# Patient Record
Sex: MALE | HISPANIC OR LATINO | URBAN - METROPOLITAN AREA
[De-identification: names, ages, dates, MRNs, and addresses within clinical notes are randomized per-mention and may not be internally consistent; named-entity substitution may affect disease eponyms.]

---

## 2019-04-04 ENCOUNTER — OFFICE VISIT (OUTPATIENT)
Dept: URBAN - METROPOLITAN AREA CLINIC 17 | Facility: CLINIC | Age: 74
End: 2019-04-04
Payer: MEDICARE

## 2019-04-04 DIAGNOSIS — H11.042 STATIONARY PERIPHERAL PTERYGIUM OF LEFT EYE: Chronic | ICD-10-CM

## 2019-04-04 DIAGNOSIS — G51.31 CLONIC HEMIFACIAL SPASM, RIGHT: Primary | Chronic | ICD-10-CM

## 2019-04-04 PROCEDURE — 64612 DESTROY NERVE FACE MUSCLE: CPT | Performed by: OPHTHALMOLOGY

## 2019-04-04 PROCEDURE — 92012 INTRM OPH EXAM EST PATIENT: CPT | Performed by: OPHTHALMOLOGY

## 2019-04-04 ASSESSMENT — INTRAOCULAR PRESSURE
OD: 15
OS: 15

## 2019-04-04 NOTE — IMPRESSION/PLAN
Impression: Dermatochalasis of left upper eyelid: H02.834. OS. Condition: established, worsening. Symptoms: will continue to monitor. Vision: vision threatening. Plan: Discussed diagnosis in detail with patient. Discussed treatment options with patient. Patient prefers no treatment at this time. Will continue to observe condition and or symptoms.

## 2019-04-04 NOTE — IMPRESSION/PLAN
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 OS. Condition: established, stable. Symptoms: will continue to monitor. Vision: vision not affected. Plan: Discussed diagnosis in detail with patient. Discussed treatment options with patient. No treatment is required at this time. Will continue to observe condition and or symptoms.

## 2019-04-04 NOTE — IMPRESSION/PLAN
Impression: Clonic hemifacial spasm, right: G51.31. OD. Condition: recurrent. Symptoms: will treat with meds. Vision: vision affected. Plan: Discussed diagnosis in detail with patient. Discussed treatment options with patient. No change to current treatment. Current therapy is best for patient. Risks and benefits were discussed, explained and understood by patient. Proceed with 40 Units of Botox, right side. See template for pattern.

## 2019-04-04 NOTE — IMPRESSION/PLAN
Impression: Dermatochalasis of right upper eyelid: H02.831. OD. Condition: established, worsening. Symptoms: will continue to monitor. Vision: vision threatening. Plan: Discussed diagnosis in detail with patient. Discussed treatment options with patient. Patient prefers no treatment at this time. Will continue to observe condition and or symptoms.

## 2019-04-18 ENCOUNTER — OFFICE VISIT (OUTPATIENT)
Dept: URBAN - METROPOLITAN AREA CLINIC 17 | Facility: CLINIC | Age: 74
End: 2019-04-18
Payer: MEDICARE

## 2019-04-18 PROCEDURE — 92012 INTRM OPH EXAM EST PATIENT: CPT | Performed by: OPTOMETRIST

## 2019-04-18 RX ORDER — NEOMYCIN, POLYMYXIN B SULFATES, DEXAMETHASONE 1; 3.5; 1 MG/G; MG/G; [USP'U]/G
OINTMENT OPHTHALMIC
Qty: 1 | Refills: 0 | Status: INACTIVE
Start: 2019-04-18 | End: 2019-05-08

## 2019-04-18 ASSESSMENT — INTRAOCULAR PRESSURE
OS: 12
OD: 11

## 2019-04-18 NOTE — IMPRESSION/PLAN
Impression: Other specified disorders of eyelid: H02.89. OD. Plan: Discussed condition with pt. New prescription given today.  Pt to start Neto-poly-dex  maryann TID OD

## 2019-05-08 ENCOUNTER — OFFICE VISIT (OUTPATIENT)
Dept: URBAN - METROPOLITAN AREA CLINIC 17 | Facility: CLINIC | Age: 74
End: 2019-05-08
Payer: MEDICARE

## 2019-05-08 DIAGNOSIS — H02.89 OTHER SPECIFIED DISORDERS OF EYELID: Primary | ICD-10-CM

## 2019-05-08 PROCEDURE — 99213 OFFICE O/P EST LOW 20 MIN: CPT | Performed by: OPTOMETRIST

## 2019-05-08 RX ORDER — NEOMYCIN, POLYMYXIN B SULFATES, DEXAMETHASONE 1; 3.5; 1 MG/G; MG/G; [USP'U]/G
OINTMENT OPHTHALMIC
Qty: 1 | Refills: 0 | Status: INACTIVE
Start: 2019-05-08 | End: 2020-06-03

## 2019-05-08 ASSESSMENT — INTRAOCULAR PRESSURE
OS: 8
OD: 6

## 2019-05-08 NOTE — IMPRESSION/PLAN
Impression: Other specified disorders of eyelid: H02.89. OD.-improved Plan: Discussed condition with pt. Pt to finish rhea-poly-dex maryann.

## 2020-06-03 ENCOUNTER — OFFICE VISIT (OUTPATIENT)
Dept: URBAN - METROPOLITAN AREA CLINIC 17 | Facility: CLINIC | Age: 75
End: 2020-06-03
Payer: MEDICARE

## 2020-06-03 DIAGNOSIS — H02.831 DERMATOCHALASIS OF RIGHT UPPER EYELID: ICD-10-CM

## 2020-06-03 DIAGNOSIS — H10.45 OTHER CHRONIC ALLERGIC CONJUNCTIVITIS: Primary | ICD-10-CM

## 2020-06-03 DIAGNOSIS — H15.111 EPISCLERITIS PERIODICA FUGAX, RIGHT EYE: ICD-10-CM

## 2020-06-03 DIAGNOSIS — H02.834 DERMATOCHALASIS OF LEFT UPPER EYELID: ICD-10-CM

## 2020-06-03 DIAGNOSIS — H25.13 AGE-RELATED NUCLEAR CATARACT, BILATERAL: ICD-10-CM

## 2020-06-03 PROCEDURE — 92014 COMPRE OPH EXAM EST PT 1/>: CPT | Performed by: OPTOMETRIST

## 2020-06-03 RX ORDER — PREDNISOLONE ACETATE 10 MG/ML
1 % SUSPENSION/ DROPS OPHTHALMIC
Qty: 1 | Refills: 0 | Status: ACTIVE
Start: 2020-06-03

## 2020-06-03 ASSESSMENT — INTRAOCULAR PRESSURE
OS: 11
OD: 9

## 2020-06-03 NOTE — IMPRESSION/PLAN
Impression: Other chronic allergic conjunctivitis: H10.45. Plan: Discussed diagnosis in detail with patient. Start Pred Ace QID OU x 1 week then d/c. After finising Pred ace, start Pataday QD OU. Patient instructed to call if condition gets worse.

## 2022-06-06 ENCOUNTER — OFFICE VISIT (OUTPATIENT)
Dept: URBAN - METROPOLITAN AREA CLINIC 17 | Facility: CLINIC | Age: 77
End: 2022-06-06
Payer: MEDICARE

## 2022-06-06 DIAGNOSIS — E11.9 TYPE 2 DIABETES MELLITUS W/O COMPLICATION: ICD-10-CM

## 2022-06-06 DIAGNOSIS — H11.042 STATIONARY PERIPHERAL PTERYGIUM OF LEFT EYE: ICD-10-CM

## 2022-06-06 DIAGNOSIS — H17.89 OTHER CORNEAL SCAR: ICD-10-CM

## 2022-06-06 DIAGNOSIS — H25.13 AGE-RELATED NUCLEAR CATARACT, BILATERAL: ICD-10-CM

## 2022-06-06 DIAGNOSIS — E11.3292 TYPE 2 DIAB W MILD NONPRLF DIABETIC RTNOP W/O MACULAR EDEMA, LEFT EYE: Primary | ICD-10-CM

## 2022-06-06 PROCEDURE — 99214 OFFICE O/P EST MOD 30 MIN: CPT | Performed by: OPTOMETRIST

## 2022-06-06 ASSESSMENT — INTRAOCULAR PRESSURE
OD: 9
OS: 11

## 2022-06-06 NOTE — IMPRESSION/PLAN
Impression: Other corneal scar: H17.89. Right. Plan: No treatment is required at this time. Will continue to observe condition and or symptoms.

## 2022-06-06 NOTE — IMPRESSION/PLAN
Impression: Age-related nuclear cataract, bilateral: H25.13. Plan: No treatment currently recommended. The patient will monitor vision changes and contact us with any decrease in vision. Recommend refraction PRN.

## 2022-06-06 NOTE — IMPRESSION/PLAN
Impression: Type 2 diabetes mellitus w/o complication: L89.9. Right. Plan: Diabetes type II: no background retinopathy, no signs of neovascularization noted. Discussed ocular and systemic benefits of blood sugar control.

## 2022-06-06 NOTE — IMPRESSION/PLAN
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 OS. Condition: established, stable. Symptoms: will continue to monitor. Vision: vision not affected. Plan: No treatment is required at this time. Will continue to observe condition and or symptoms.

## 2025-02-25 ENCOUNTER — OFFICE VISIT (OUTPATIENT)
Dept: URBAN - METROPOLITAN AREA CLINIC 17 | Facility: CLINIC | Age: 80
End: 2025-02-25
Payer: MEDICARE

## 2025-02-25 DIAGNOSIS — H04.123 DRY EYE SYNDROME OF BILATERAL LACRIMAL GLANDS: ICD-10-CM

## 2025-02-25 DIAGNOSIS — H25.13 AGE-RELATED NUCLEAR CATARACT, BILATERAL: ICD-10-CM

## 2025-02-25 DIAGNOSIS — E11.9 TYPE 2 DIABETES MELLITUS W/O COMPLICATION: Primary | ICD-10-CM

## 2025-02-25 PROCEDURE — 92014 COMPRE OPH EXAM EST PT 1/>: CPT | Performed by: OPTOMETRIST

## 2025-02-25 ASSESSMENT — INTRAOCULAR PRESSURE
OS: 9
OD: 9